# Patient Record
Sex: MALE | Race: WHITE | RURAL
[De-identification: names, ages, dates, MRNs, and addresses within clinical notes are randomized per-mention and may not be internally consistent; named-entity substitution may affect disease eponyms.]

---

## 2021-01-27 ENCOUNTER — APPOINTMENT (OUTPATIENT)
Age: 61
Setting detail: DERMATOLOGY
End: 2021-01-28

## 2021-01-27 VITALS — TEMPERATURE: 98.1 F

## 2021-01-27 DIAGNOSIS — L40.0 PSORIASIS VULGARIS: ICD-10-CM

## 2021-01-27 DIAGNOSIS — D485 NEOPLASM OF UNCERTAIN BEHAVIOR OF SKIN: ICD-10-CM

## 2021-01-27 PROBLEM — D48.5 NEOPLASM OF UNCERTAIN BEHAVIOR OF SKIN: Status: ACTIVE | Noted: 2021-01-27

## 2021-01-27 PROCEDURE — 99203 OFFICE O/P NEW LOW 30 MIN: CPT | Mod: 25

## 2021-01-27 PROCEDURE — OTHER MIPS QUALITY: OTHER

## 2021-01-27 PROCEDURE — 11102 TANGNTL BX SKIN SINGLE LES: CPT

## 2021-01-27 PROCEDURE — OTHER BIOPSY BY SHAVE METHOD: OTHER

## 2021-01-27 PROCEDURE — OTHER PRESCRIPTION: OTHER

## 2021-01-27 PROCEDURE — OTHER TREATMENT REGIMEN: OTHER

## 2021-01-27 RX ORDER — TRIAMCINOLONE ACETONIDE 1 MG/G
1 CREAM TOPICAL BID
Qty: 1 | Refills: 3 | Status: ERX | COMMUNITY
Start: 2021-01-27

## 2021-01-27 ASSESSMENT — LOCATION SIMPLE DESCRIPTION DERM: LOCATION SIMPLE: LEFT FOREARM

## 2021-01-27 ASSESSMENT — LOCATION DETAILED DESCRIPTION DERM: LOCATION DETAILED: LEFT DISTAL DORSAL FOREARM

## 2021-01-27 ASSESSMENT — LOCATION ZONE DERM: LOCATION ZONE: ARM

## 2021-01-27 NOTE — HPI: RASH
How Severe Is Your Rash?: moderate
Is This A New Presentation, Or A Follow-Up?: Rash
Additional History: Patient stated he sat three different doctors who gave him three different diagnoses and the last provider stated it was psoriasis and wanted to start him on a biologic injection but COVID interfered, \\nPatient is here today to begin the process with us.

## 2021-01-27 NOTE — PROCEDURE: BIOPSY BY SHAVE METHOD
Hide Additional Size Dimension?: No
Consent: Written consent was obtained and risks were reviewed including but not limited to scarring, infection, bleeding, scabbing, incomplete removal, nerve damage and allergy to anesthesia.  Clinical evaluation reveals changes suspicious for rule out provided in pathology requisition.  Intent of procedure is to obtain tissue sample for histopathic examination.  A skin biopsy is considered a necessary and appropriate to clarify the diagnosis.
Was A Bandage Applied: Yes
Biopsy Method: 10 blade
Additional Anesthesia Volume In Cc (Will Not Render If 0): 0
Anesthesia Type: 1% lidocaine with epinephrine
Information: Selecting Yes will display possible errors in your note based on the variables you have selected. This validation is only offered as a suggestion for you. PLEASE NOTE THAT THE VALIDATION TEXT WILL BE REMOVED WHEN YOU FINALIZE YOUR NOTE. IF YOU WANT TO FAX A PRELIMINARY NOTE YOU WILL NEED TO TOGGLE THIS TO 'NO' IF YOU DO NOT WANT IT IN YOUR FAXED NOTE.
Dressing: bandage
Notification Instructions: Patient will be notified of biopsy results. However, patient instructed to call the office if not contacted within 2 weeks.
Curettage Text: The wound bed was treated with curettage after the biopsy was performed.
Electrodesiccation And Curettage Text: The wound bed was treated with electrodesiccation and curettage after the biopsy was performed.
Billing Type: Third-Party Bill
Post-Care Instructions: I reviewed with the patient in detail post-care instructions. Patient is to keep the biopsy site dry overnight, and then apply bacitracin twice daily until healed. Patient may apply hydrogen peroxide soaks to remove any crusting.
Depth Of Biopsy: dermis
X Size Of Lesion In Cm: 0.1
Biopsy Type: H and E
Hemostasis: Electrocautery
Wound Care: Vaseline
Electrodesiccation Text: The wound bed was treated with electrodesiccation after the biopsy was performed.
Type Of Destruction Used: Curettage
Size Of Lesion In Cm: 0.2
Anesthesia Volume In Cc: 0.5
Cryotherapy Text: The wound bed was treated with cryotherapy after the biopsy was performed.
Detail Level: Detailed
Silver Nitrate Text: The wound bed was treated with silver nitrate after the biopsy was performed.

## 2021-01-27 NOTE — PROCEDURE: MIPS QUALITY
Quality 226: Preventive Care And Screening: Tobacco Use: Screening And Cessation Intervention: Patient screened for tobacco use and is an ex/non-smoker
Quality 111:Pneumonia Vaccination Status For Older Adults: Pneumococcal Vaccination not Administered or Previously Received, Reason not Otherwise Specified
Detail Level: Detailed
Quality 47: Advance Care Plan: Advance Care Planning discussed and documented; advance care plan or surrogate decision maker documented in the medical record.
Quality 110: Preventive Care And Screening: Influenza Immunization: Influenza Immunization not Administered because Patient Refused.

## 2021-01-27 NOTE — PROCEDURE: TREATMENT REGIMEN
Detail Level: Zone
Action 3: Continue
Plan: Triamcinolone cream to all affected areas BID x2 weeks then PRN flares

## 2021-02-10 ENCOUNTER — APPOINTMENT (OUTPATIENT)
Age: 61
Setting detail: DERMATOLOGY
End: 2021-02-11

## 2021-02-10 VITALS — TEMPERATURE: 97.6 F

## 2021-02-10 DIAGNOSIS — L40.0 PSORIASIS VULGARIS: ICD-10-CM

## 2021-02-10 DIAGNOSIS — L82.1 OTHER SEBORRHEIC KERATOSIS: ICD-10-CM

## 2021-02-10 PROCEDURE — OTHER MIPS QUALITY: OTHER

## 2021-02-10 PROCEDURE — OTHER COUNSELING: OTHER

## 2021-02-10 PROCEDURE — OTHER PRESCRIPTION: OTHER

## 2021-02-10 PROCEDURE — OTHER REASSURANCE: OTHER

## 2021-02-10 PROCEDURE — OTHER TREATMENT REGIMEN: OTHER

## 2021-02-10 PROCEDURE — 99214 OFFICE O/P EST MOD 30 MIN: CPT

## 2021-02-10 RX ORDER — CLOBETASOL PROPIONATE 0.5 MG/G
1 AEROSOL, FOAM TOPICAL BID
Qty: 1 | Refills: 10 | Status: ERX | COMMUNITY
Start: 2021-02-10

## 2021-02-10 ASSESSMENT — LOCATION DETAILED DESCRIPTION DERM
LOCATION DETAILED: LEFT POSTERIOR SHOULDER
LOCATION DETAILED: RIGHT PROXIMAL POSTERIOR UPPER ARM
LOCATION DETAILED: LEFT ANTERIOR DISTAL THIGH
LOCATION DETAILED: LEFT MEDIAL INFERIOR CHEST
LOCATION DETAILED: LEFT PROXIMAL DORSAL FOREARM
LOCATION DETAILED: LEFT DISTAL POSTERIOR THIGH
LOCATION DETAILED: RIGHT ANTERIOR PROXIMAL THIGH
LOCATION DETAILED: LEFT PROXIMAL PRETIBIAL REGION
LOCATION DETAILED: RIGHT DISTAL CALF
LOCATION DETAILED: LEFT PROXIMAL POSTERIOR UPPER ARM
LOCATION DETAILED: RIGHT POSTERIOR SHOULDER
LOCATION DETAILED: RIGHT PROXIMAL PRETIBIAL REGION
LOCATION DETAILED: LEFT DISTAL POSTERIOR UPPER ARM
LOCATION DETAILED: LEFT DISTAL DORSAL FOREARM
LOCATION DETAILED: RIGHT PROXIMAL DORSAL FOREARM
LOCATION DETAILED: SUPERIOR LUMBAR SPINE
LOCATION DETAILED: RIGHT DISTAL POSTERIOR UPPER ARM
LOCATION DETAILED: RIGHT MEDIAL SUPERIOR CHEST
LOCATION DETAILED: RIGHT DISTAL DORSAL FOREARM
LOCATION DETAILED: LEFT DISTAL CALF
LOCATION DETAILED: RIGHT DISTAL POSTERIOR THIGH

## 2021-02-10 ASSESSMENT — LOCATION SIMPLE DESCRIPTION DERM
LOCATION SIMPLE: RIGHT THIGH
LOCATION SIMPLE: LEFT POSTERIOR THIGH
LOCATION SIMPLE: LEFT FOREARM
LOCATION SIMPLE: LEFT SHOULDER
LOCATION SIMPLE: LEFT CALF
LOCATION SIMPLE: RIGHT CALF
LOCATION SIMPLE: LEFT THIGH
LOCATION SIMPLE: RIGHT SHOULDER
LOCATION SIMPLE: RIGHT PRETIBIAL REGION
LOCATION SIMPLE: LEFT UPPER ARM
LOCATION SIMPLE: RIGHT FOREARM
LOCATION SIMPLE: RIGHT UPPER ARM
LOCATION SIMPLE: LEFT PRETIBIAL REGION
LOCATION SIMPLE: CHEST
LOCATION SIMPLE: RIGHT POSTERIOR THIGH
LOCATION SIMPLE: LOWER BACK

## 2021-02-10 ASSESSMENT — PAIN INTENSITY VAS: HOW INTENSE IS YOUR PAIN 0 BEING NO PAIN, 10 BEING THE MOST SEVERE PAIN POSSIBLE?: 2/10 PAIN

## 2021-02-10 ASSESSMENT — BSA PSORIASIS: % BODY COVERED IN PSORIASIS: 40

## 2021-02-10 ASSESSMENT — PGA PSORIASIS: PGA PSORIASIS 2020: MODERATE

## 2021-02-10 ASSESSMENT — LOCATION ZONE DERM
LOCATION ZONE: TRUNK
LOCATION ZONE: ARM
LOCATION ZONE: LEG

## 2021-02-10 NOTE — PROCEDURE: MIPS QUALITY
Quality 226: Preventive Care And Screening: Tobacco Use: Screening And Cessation Intervention: Patient screened for tobacco use and is an ex/non-smoker
Quality 110: Preventive Care And Screening: Influenza Immunization: Influenza Immunization not Administered because Patient Refused.
Quality 111:Pneumonia Vaccination Status For Older Adults: Pneumococcal Vaccination not Administered or Previously Received, Reason not Otherwise Specified
Detail Level: Detailed
Quality 47: Advance Care Plan: Advance Care Planning discussed and documented; advance care plan or surrogate decision maker documented in the medical record.

## 2021-02-10 NOTE — PROCEDURE: TREATMENT REGIMEN
Action 4: Continue
Discontinue Regimen: Triamcinolone
Initiate Regimen: Clobetasol foam
Detail Level: Zone
Plan: Clobetasol foam to all affected areas BID x2 weeks then PRN flares

## 2021-04-22 ENCOUNTER — APPOINTMENT (OUTPATIENT)
Age: 61
Setting detail: DERMATOLOGY
End: 2021-04-23

## 2021-04-22 VITALS — TEMPERATURE: 97.8 F

## 2021-04-22 DIAGNOSIS — L40.0 PSORIASIS VULGARIS: ICD-10-CM

## 2021-04-22 DIAGNOSIS — L40.8 OTHER PSORIASIS: ICD-10-CM

## 2021-04-22 PROCEDURE — OTHER COUNSELING: OTHER

## 2021-04-22 PROCEDURE — OTHER TREATMENT REGIMEN: OTHER

## 2021-04-22 PROCEDURE — OTHER PRESCRIPTION MEDICATION MANAGEMENT: OTHER

## 2021-04-22 PROCEDURE — OTHER MIPS QUALITY: OTHER

## 2021-04-22 PROCEDURE — 99214 OFFICE O/P EST MOD 30 MIN: CPT

## 2021-04-22 ASSESSMENT — LOCATION SIMPLE DESCRIPTION DERM
LOCATION SIMPLE: LEFT SMALL FINGER
LOCATION SIMPLE: LEFT CALF
LOCATION SIMPLE: LEFT FOREARM
LOCATION SIMPLE: LOWER BACK
LOCATION SIMPLE: LEFT SHOULDER
LOCATION SIMPLE: LEFT RING FINGER
LOCATION SIMPLE: RIGHT SHOULDER
LOCATION SIMPLE: RIGHT PRETIBIAL REGION
LOCATION SIMPLE: RIGHT POSTERIOR THIGH
LOCATION SIMPLE: RIGHT UPPER ARM
LOCATION SIMPLE: RIGHT MIDDLE FINGER
LOCATION SIMPLE: RIGHT THIGH
LOCATION SIMPLE: CHEST
LOCATION SIMPLE: RIGHT RING FINGER
LOCATION SIMPLE: RIGHT INDEX FINGER
LOCATION SIMPLE: LEFT POSTERIOR THIGH
LOCATION SIMPLE: LEFT THIGH
LOCATION SIMPLE: LEFT INDEX FINGER
LOCATION SIMPLE: LEFT PRETIBIAL REGION
LOCATION SIMPLE: LEFT UPPER ARM
LOCATION SIMPLE: LEFT MIDDLE FINGER
LOCATION SIMPLE: RIGHT SMALL FINGER
LOCATION SIMPLE: RIGHT FOREARM
LOCATION SIMPLE: RIGHT CALF

## 2021-04-22 ASSESSMENT — LOCATION DETAILED DESCRIPTION DERM
LOCATION DETAILED: RIGHT MEDIAL SUPERIOR CHEST
LOCATION DETAILED: RIGHT RING FINGERNAIL
LOCATION DETAILED: SUPERIOR LUMBAR SPINE
LOCATION DETAILED: RIGHT DISTAL POSTERIOR THIGH
LOCATION DETAILED: LEFT PROXIMAL PRETIBIAL REGION
LOCATION DETAILED: LEFT DISTAL POSTERIOR THIGH
LOCATION DETAILED: LEFT DISTAL CALF
LOCATION DETAILED: RIGHT POSTERIOR SHOULDER
LOCATION DETAILED: RIGHT SMALL FINGERNAIL
LOCATION DETAILED: LEFT PROXIMAL POSTERIOR UPPER ARM
LOCATION DETAILED: RIGHT MIDDLE FINGERNAIL
LOCATION DETAILED: RIGHT DISTAL DORSAL FOREARM
LOCATION DETAILED: LEFT MEDIAL INFERIOR CHEST
LOCATION DETAILED: LEFT POSTERIOR SHOULDER
LOCATION DETAILED: RIGHT INDEX FINGERNAIL
LOCATION DETAILED: RIGHT DISTAL CALF
LOCATION DETAILED: LEFT DISTAL DORSAL FOREARM
LOCATION DETAILED: LEFT ANTERIOR DISTAL THIGH
LOCATION DETAILED: LEFT RING FINGERNAIL
LOCATION DETAILED: LEFT SMALL FINGERNAIL
LOCATION DETAILED: RIGHT PROXIMAL PRETIBIAL REGION
LOCATION DETAILED: LEFT INDEX FINGERNAIL
LOCATION DETAILED: LEFT MIDDLE FINGERNAIL
LOCATION DETAILED: RIGHT PROXIMAL POSTERIOR UPPER ARM
LOCATION DETAILED: RIGHT ANTERIOR PROXIMAL THIGH

## 2021-04-22 ASSESSMENT — BSA PSORIASIS: % BODY COVERED IN PSORIASIS: 10

## 2021-04-22 ASSESSMENT — LOCATION ZONE DERM
LOCATION ZONE: LEG
LOCATION ZONE: TRUNK
LOCATION ZONE: FINGERNAIL
LOCATION ZONE: ARM

## 2021-04-22 NOTE — PROCEDURE: TREATMENT REGIMEN
Action 4: Continue
Initiate Regimen: Clobetasol foam
Discontinue Regimen: Triamcinolone
Plan: Clobetasol foam to all affected areas BID x2 weeks then PRN flares
Detail Level: Zone

## 2021-04-22 NOTE — PROCEDURE: MIPS QUALITY
Quality 111:Pneumonia Vaccination Status For Older Adults: Pneumococcal Vaccination not Administered or Previously Received, Reason not Otherwise Specified
Quality 226: Preventive Care And Screening: Tobacco Use: Screening And Cessation Intervention: Patient screened for tobacco use and is an ex/non-smoker
Quality 47: Advance Care Plan: Advance Care Planning discussed and documented; advance care plan or surrogate decision maker documented in the medical record.
Detail Level: Detailed
Quality 110: Preventive Care And Screening: Influenza Immunization: Influenza Immunization not Administered because Patient Refused.

## 2021-04-22 NOTE — PROCEDURE: PRESCRIPTION MEDICATION MANAGEMENT
Render In Strict Bullet Format?: No
Detail Level: Zone
Plan: Patient has 30% BSA. Scalp and nail involved. Patient c/o joint pain.\\nLab slip given to check labs
Initiate Treatment: Taltz

## 2021-05-24 ENCOUNTER — APPOINTMENT (OUTPATIENT)
Age: 61
Setting detail: DERMATOLOGY
End: 2021-05-25

## 2021-05-24 VITALS — TEMPERATURE: 97.4 F

## 2021-05-24 DIAGNOSIS — L40.8 OTHER PSORIASIS: ICD-10-CM

## 2021-05-24 DIAGNOSIS — L40.0 PSORIASIS VULGARIS: ICD-10-CM

## 2021-05-24 PROCEDURE — OTHER PRESCRIPTION MEDICATION MANAGEMENT: OTHER

## 2021-05-24 PROCEDURE — OTHER MIPS QUALITY: OTHER

## 2021-05-24 PROCEDURE — OTHER COUNSELING: OTHER

## 2021-05-24 PROCEDURE — 99214 OFFICE O/P EST MOD 30 MIN: CPT

## 2021-05-24 PROCEDURE — OTHER TREATMENT REGIMEN: OTHER

## 2021-05-24 PROCEDURE — OTHER PRESCRIPTION: OTHER

## 2021-05-24 RX ORDER — CLOBETASOL PROPIONATE 0.5 MG/G
1 AEROSOL, FOAM TOPICAL BID
Qty: 1 | Refills: 3 | Status: ERX

## 2021-05-24 RX ORDER — CLOBETASOL PROPIONATE 0.5 MG/G
AEROSOL, FOAM TOPICAL BID
Qty: 1 | Refills: 3 | Status: ERX

## 2021-05-24 ASSESSMENT — LOCATION SIMPLE DESCRIPTION DERM
LOCATION SIMPLE: LEFT SHOULDER
LOCATION SIMPLE: LEFT MIDDLE FINGER
LOCATION SIMPLE: RIGHT MIDDLE FINGER
LOCATION SIMPLE: LEFT POSTERIOR THIGH
LOCATION SIMPLE: RIGHT SHOULDER
LOCATION SIMPLE: CHEST
LOCATION SIMPLE: RIGHT THIGH
LOCATION SIMPLE: LEFT FOREARM
LOCATION SIMPLE: LEFT UPPER ARM
LOCATION SIMPLE: RIGHT POSTERIOR THIGH
LOCATION SIMPLE: LEFT PRETIBIAL REGION
LOCATION SIMPLE: RIGHT SMALL FINGER
LOCATION SIMPLE: RIGHT INDEX FINGER
LOCATION SIMPLE: LEFT CALF
LOCATION SIMPLE: LEFT THIGH
LOCATION SIMPLE: LEFT INDEX FINGER
LOCATION SIMPLE: RIGHT RING FINGER
LOCATION SIMPLE: RIGHT FOREARM
LOCATION SIMPLE: LEFT RING FINGER
LOCATION SIMPLE: LEFT SMALL FINGER
LOCATION SIMPLE: RIGHT PRETIBIAL REGION
LOCATION SIMPLE: LOWER BACK
LOCATION SIMPLE: RIGHT CALF
LOCATION SIMPLE: RIGHT UPPER ARM

## 2021-05-24 ASSESSMENT — LOCATION DETAILED DESCRIPTION DERM
LOCATION DETAILED: LEFT PROXIMAL POSTERIOR UPPER ARM
LOCATION DETAILED: LEFT MEDIAL INFERIOR CHEST
LOCATION DETAILED: LEFT POSTERIOR SHOULDER
LOCATION DETAILED: LEFT SMALL FINGERNAIL
LOCATION DETAILED: RIGHT PROXIMAL PRETIBIAL REGION
LOCATION DETAILED: RIGHT DISTAL CALF
LOCATION DETAILED: LEFT MIDDLE FINGERNAIL
LOCATION DETAILED: LEFT PROXIMAL PRETIBIAL REGION
LOCATION DETAILED: LEFT INDEX FINGERNAIL
LOCATION DETAILED: RIGHT ANTERIOR PROXIMAL THIGH
LOCATION DETAILED: SUPERIOR LUMBAR SPINE
LOCATION DETAILED: RIGHT INDEX FINGERNAIL
LOCATION DETAILED: LEFT RING FINGERNAIL
LOCATION DETAILED: LEFT DISTAL DORSAL FOREARM
LOCATION DETAILED: RIGHT MIDDLE FINGERNAIL
LOCATION DETAILED: LEFT DISTAL POSTERIOR THIGH
LOCATION DETAILED: RIGHT RING FINGERNAIL
LOCATION DETAILED: LEFT ANTERIOR DISTAL THIGH
LOCATION DETAILED: RIGHT DISTAL DORSAL FOREARM
LOCATION DETAILED: RIGHT PROXIMAL POSTERIOR UPPER ARM
LOCATION DETAILED: RIGHT MEDIAL SUPERIOR CHEST
LOCATION DETAILED: LEFT DISTAL CALF
LOCATION DETAILED: RIGHT DISTAL POSTERIOR THIGH
LOCATION DETAILED: RIGHT SMALL FINGERNAIL
LOCATION DETAILED: RIGHT POSTERIOR SHOULDER

## 2021-05-24 ASSESSMENT — LOCATION ZONE DERM
LOCATION ZONE: FINGERNAIL
LOCATION ZONE: ARM
LOCATION ZONE: TRUNK
LOCATION ZONE: LEG

## 2021-05-24 ASSESSMENT — PGA PSORIASIS: PGA PSORIASIS 2020: MODERATE

## 2021-05-24 ASSESSMENT — BSA PSORIASIS: % BODY COVERED IN PSORIASIS: 32

## 2021-05-24 NOTE — PROCEDURE: TREATMENT REGIMEN
Action 4: Continue
Initiate Regimen: Clobetasol foam
Plan: Clobetasol foam to all affected areas BID x2 weeks then PRN flares
Detail Level: Zone
Discontinue Regimen: Triamcinolone

## 2021-05-24 NOTE — PROCEDURE: MIPS QUALITY
Quality 110: Preventive Care And Screening: Influenza Immunization: Influenza Immunization not Administered because Patient Refused.
Quality 111:Pneumonia Vaccination Status For Older Adults: Pneumococcal Vaccination not Administered or Previously Received, Reason not Otherwise Specified
Quality 47: Advance Care Plan: Advance Care Planning discussed and documented; advance care plan or surrogate decision maker documented in the medical record.
Detail Level: Detailed
Quality 226: Preventive Care And Screening: Tobacco Use: Screening And Cessation Intervention: Patient screened for tobacco use and is an ex/non-smoker

## 2021-05-24 NOTE — PROCEDURE: PRESCRIPTION MEDICATION MANAGEMENT
Detail Level: Zone
Plan: Patient has 30% BSA. Scalp and nail involved. Patient c/o joint pain.\\nLab slip given to check labs again- patient never got blood work done in April \\nPatient given enstilar foam sample and prescribed Clobetasol 100mg
Samples Given: Enstilar foam 60 mg can
Initiate Treatment: Taltz
Render In Strict Bullet Format?: No

## 2021-06-02 ENCOUNTER — RX ONLY (RX ONLY)
Age: 61
End: 2021-06-02

## 2021-06-02 RX ORDER — CLOBETASOL PROPIONATE 0.5 MG/G
2 AEROSOL, FOAM TOPICAL BID
Qty: 1 | Refills: 6 | Status: ERX

## 2022-11-18 ENCOUNTER — APPOINTMENT (OUTPATIENT)
Age: 62
Setting detail: DERMATOLOGY
End: 2022-11-21

## 2022-11-18 DIAGNOSIS — L40.8 OTHER PSORIASIS: ICD-10-CM

## 2022-11-18 DIAGNOSIS — L40.0 PSORIASIS VULGARIS: ICD-10-CM

## 2022-11-18 PROCEDURE — OTHER TREATMENT REGIMEN: OTHER

## 2022-11-18 PROCEDURE — OTHER MIPS QUALITY: OTHER

## 2022-11-18 PROCEDURE — OTHER PRESCRIPTION MEDICATION MANAGEMENT: OTHER

## 2022-11-18 PROCEDURE — OTHER COUNSELING: OTHER

## 2022-11-18 PROCEDURE — 99214 OFFICE O/P EST MOD 30 MIN: CPT

## 2022-11-18 ASSESSMENT — LOCATION SIMPLE DESCRIPTION DERM
LOCATION SIMPLE: RIGHT RING FINGER
LOCATION SIMPLE: RIGHT MIDDLE FINGER
LOCATION SIMPLE: RIGHT INDEX FINGER
LOCATION SIMPLE: RIGHT SMALL FINGER
LOCATION SIMPLE: LEFT SMALL FINGER
LOCATION SIMPLE: LEFT INDEX FINGER
LOCATION SIMPLE: LEFT MIDDLE FINGER
LOCATION SIMPLE: LEFT RING FINGER

## 2022-11-18 ASSESSMENT — ITCH NUMERIC RATING SCALE: HOW SEVERE IS YOUR ITCHING?: 9

## 2022-11-18 ASSESSMENT — LOCATION DETAILED DESCRIPTION DERM
LOCATION DETAILED: LEFT INDEX FINGERNAIL
LOCATION DETAILED: RIGHT SMALL FINGERNAIL
LOCATION DETAILED: LEFT MIDDLE FINGERNAIL
LOCATION DETAILED: LEFT SMALL FINGERNAIL
LOCATION DETAILED: RIGHT RING FINGERNAIL
LOCATION DETAILED: RIGHT INDEX FINGERNAIL
LOCATION DETAILED: RIGHT MIDDLE FINGERNAIL
LOCATION DETAILED: LEFT RING FINGERNAIL

## 2022-11-18 ASSESSMENT — BSA PSORIASIS: % BODY COVERED IN PSORIASIS: 22

## 2022-11-18 ASSESSMENT — LOCATION ZONE DERM: LOCATION ZONE: FINGERNAIL

## 2022-11-18 ASSESSMENT — PGA PSORIASIS: PGA PSORIASIS 2020: MODERATE

## 2022-11-18 NOTE — PROCEDURE: TREATMENT REGIMEN
Discontinue Regimen: Triamcinolone
Action 3: Continue
Detail Level: Zone
Plan: Patient has failed and tried  Clobetasol foam , Enstilar foam, he has gone to three different dermatologist and has not had success, was going to start Taltz but due to insurance he did not proceed he has new insurance and will start enrollment for [employer]. He has scalp, upper trunk and lower trunk, bilateral legs, arms involvement. BSA 22% he will RTC 2 weeks for skyrizi samples lab slip was given to [facility] for TB labs.

## 2022-11-18 NOTE — PROCEDURE: MIPS QUALITY
Quality 110: Preventive Care And Screening: Influenza Immunization: Influenza Immunization not Administered because Patient Refused.
Quality 226: Preventive Care And Screening: Tobacco Use: Screening And Cessation Intervention: Patient screened for tobacco use and is an ex/non-smoker
Quality 111:Pneumonia Vaccination Status For Older Adults: Pneumococcal Vaccination not Administered or Previously Received, Reason not Otherwise Specified
Quality 47: Advance Care Plan: Advance Care Planning discussed and documented; advance care plan or surrogate decision maker documented in the medical record.
Detail Level: Detailed